# Patient Record
Sex: FEMALE | Race: WHITE | NOT HISPANIC OR LATINO | URBAN - METROPOLITAN AREA
[De-identification: names, ages, dates, MRNs, and addresses within clinical notes are randomized per-mention and may not be internally consistent; named-entity substitution may affect disease eponyms.]

---

## 2019-07-26 ENCOUNTER — PREPPED CHART (OUTPATIENT)
Dept: URBAN - METROPOLITAN AREA CLINIC 34 | Facility: CLINIC | Age: 44
End: 2019-07-26

## 2019-07-26 PROBLEM — H52.13 MYOPIA: Noted: 2019-07-26

## 2019-07-26 PROBLEM — G44.209 TENSION HEADACHE: Noted: 2019-07-26

## 2019-07-26 PROBLEM — H10.45 GIANT PAPILLARY CONJUNCTIVITIS: Noted: 2019-07-26

## 2019-07-26 PROBLEM — H53.8 BLURRED VISION: Noted: 2018-06-25

## 2019-07-26 PROBLEM — H52.13 MYOPIA: Status: STABILIZING | Noted: 2019-07-26

## 2019-09-16 ENCOUNTER — APPOINTMENT (RX ONLY)
Dept: URBAN - METROPOLITAN AREA CLINIC 42 | Facility: CLINIC | Age: 44
Setting detail: DERMATOLOGY
End: 2019-09-16

## 2019-09-16 DIAGNOSIS — D18.0 HEMANGIOMA: ICD-10-CM

## 2019-09-16 PROBLEM — D18.01 HEMANGIOMA OF SKIN AND SUBCUTANEOUS TISSUE: Status: ACTIVE | Noted: 2019-09-16

## 2019-09-16 PROCEDURE — ? COUNSELING

## 2019-09-16 PROCEDURE — ? ADDITIONAL NOTES

## 2019-09-16 PROCEDURE — 99202 OFFICE O/P NEW SF 15 MIN: CPT

## 2019-09-16 ASSESSMENT — LOCATION DETAILED DESCRIPTION DERM: LOCATION DETAILED: DORSAL INTERPHALANGEAL JOINT RIGHT THUMB

## 2019-09-16 ASSESSMENT — LOCATION SIMPLE DESCRIPTION DERM: LOCATION SIMPLE: RIGHT THUMB

## 2019-09-16 ASSESSMENT — LOCATION ZONE DERM: LOCATION ZONE: FINGER

## 2019-09-16 NOTE — PROCEDURE: ADDITIONAL NOTES
Additional Notes: Continue to monitor \\nIf any changes rtc \\nWill recheck in 3 min
Detail Level: Simple

## 2021-12-14 ENCOUNTER — COMPLETE EYE EXAM (OUTPATIENT)
Dept: URBAN - METROPOLITAN AREA CLINIC 34 | Facility: CLINIC | Age: 46
End: 2021-12-14

## 2021-12-14 DIAGNOSIS — R51.9: ICD-10-CM

## 2021-12-14 DIAGNOSIS — H10.45: ICD-10-CM

## 2021-12-14 DIAGNOSIS — H52.13: ICD-10-CM

## 2021-12-14 DIAGNOSIS — H57.02: ICD-10-CM

## 2021-12-14 PROCEDURE — 92015 DETERMINE REFRACTIVE STATE: CPT

## 2021-12-14 PROCEDURE — 92014 COMPRE OPH EXAM EST PT 1/>: CPT

## 2021-12-14 ASSESSMENT — VISUAL ACUITY
OU_SC: J1+
OS_CC: 20/20
OD_CC: 20/20

## 2022-12-01 ENCOUNTER — PROBLEM (OUTPATIENT)
Dept: URBAN - METROPOLITAN AREA CLINIC 34 | Facility: CLINIC | Age: 47
End: 2022-12-01

## 2022-12-01 DIAGNOSIS — H11.441: ICD-10-CM

## 2022-12-01 PROCEDURE — 99213 OFFICE O/P EST LOW 20 MIN: CPT

## 2022-12-01 ASSESSMENT — VISUAL ACUITY
OS_SC: 20/20
OD_SC: 20/20

## 2022-12-29 ENCOUNTER — ESTABLISHED COMPREHENSIVE EXAM (OUTPATIENT)
Dept: URBAN - METROPOLITAN AREA CLINIC 34 | Facility: CLINIC | Age: 47
End: 2022-12-29

## 2022-12-29 DIAGNOSIS — H57.02: ICD-10-CM

## 2022-12-29 DIAGNOSIS — H52.4: ICD-10-CM

## 2022-12-29 DIAGNOSIS — H10.45: ICD-10-CM

## 2022-12-29 DIAGNOSIS — H52.221: ICD-10-CM

## 2022-12-29 DIAGNOSIS — H52.13: ICD-10-CM

## 2022-12-29 DIAGNOSIS — H11.441: ICD-10-CM

## 2022-12-29 PROCEDURE — 92015 DETERMINE REFRACTIVE STATE: CPT

## 2022-12-29 PROCEDURE — 99213 OFFICE O/P EST LOW 20 MIN: CPT

## 2022-12-29 ASSESSMENT — VISUAL ACUITY
OS_SC: 20/20
OD_SC: 20/20-1

## (undated) RX ORDER — PREDNISOLONE ACETATE 10 MG/ML
1 SUSPENSION/ DROPS OPHTHALMIC
Start: 2022-12-01 | End: 2022-12-15